# Patient Record
Sex: MALE | URBAN - METROPOLITAN AREA
[De-identification: names, ages, dates, MRNs, and addresses within clinical notes are randomized per-mention and may not be internally consistent; named-entity substitution may affect disease eponyms.]

---

## 2019-07-18 ENCOUNTER — APPOINTMENT (OUTPATIENT)
Age: 54
Setting detail: DERMATOLOGY
End: 2019-07-18

## 2019-07-18 VITALS — HEIGHT: 69 IN | WEIGHT: 166 LBS | RESPIRATION RATE: 18 BRPM

## 2019-07-18 DIAGNOSIS — L30.9 DERMATITIS, UNSPECIFIED: ICD-10-CM

## 2019-07-18 PROCEDURE — OTHER PRESCRIPTION: OTHER

## 2019-07-18 PROCEDURE — OTHER MIPS QUALITY: OTHER

## 2019-07-18 PROCEDURE — 11104 PUNCH BX SKIN SINGLE LESION: CPT

## 2019-07-18 PROCEDURE — OTHER TREATMENT REGIMEN: OTHER

## 2019-07-18 PROCEDURE — OTHER MEDICATION COUNSELING: OTHER

## 2019-07-18 PROCEDURE — OTHER COUNSELING: OTHER

## 2019-07-18 PROCEDURE — OTHER BIOPSY BY PUNCH METHOD: OTHER

## 2019-07-18 RX ORDER — CLOBETASOL PROPIONATE 0.5 MG/G
CREAM TOPICAL AS DIRECTED
Qty: 1 | Refills: 2 | Status: ERX | COMMUNITY
Start: 2019-07-18

## 2019-07-18 ASSESSMENT — LOCATION SIMPLE DESCRIPTION DERM
LOCATION SIMPLE: LEFT HAND
LOCATION SIMPLE: RIGHT HAND
LOCATION SIMPLE: CHEST
LOCATION SIMPLE: LEFT ANTERIOR NECK
LOCATION SIMPLE: ABDOMEN

## 2019-07-18 ASSESSMENT — LOCATION DETAILED DESCRIPTION DERM
LOCATION DETAILED: LEFT CLAVICULAR NECK
LOCATION DETAILED: PERIUMBILICAL SKIN
LOCATION DETAILED: LEFT RADIAL PALM
LOCATION DETAILED: LEFT LATERAL INFERIOR CHEST
LOCATION DETAILED: RIGHT LATERAL SUPERIOR CHEST
LOCATION DETAILED: RIGHT ULNAR PALM
LOCATION DETAILED: LEFT MEDIAL INFERIOR CHEST

## 2019-07-18 ASSESSMENT — LOCATION ZONE DERM
LOCATION ZONE: TRUNK
LOCATION ZONE: HAND
LOCATION ZONE: NECK

## 2019-07-18 NOTE — PROCEDURE: TREATMENT REGIMEN
Initiate Treatment: Clobetasol Cream apply twice a day x 14 days ,stop x 7 days , repeat cycle
Detail Level: Zone
Discontinue Regimen: Permethrin Cream
Continue Regimen: Prednisone taper, complete course as directed by PCM\\nIvermectin 3mg, complete course as directed by PCM
Plan: Next appointment pending pathology

## 2019-07-18 NOTE — PROCEDURE: BIOPSY BY PUNCH METHOD
Anesthesia Volume In Cc (Will Not Render If 0): 0.5
Was A Bandage Applied: Yes
Anesthesia Type: 1% lidocaine with epinephrine
Suture Removal: 10 days
Bill 53055 For Specimen Handling/Conveyance To Laboratory?: no
Size Of Lesion In Cm (Optional): 0
Hemostasis: None
Detail Level: Detailed
Punch Size In Mm: 4
Dressing: bandage
Biopsy Type: H and E
Wound Care: Petrolatum
Epidermal Sutures: 4-0 Surgipro
Billing Type: Third-Party Bill

## 2019-07-18 NOTE — PROCEDURE: MEDICATION COUNSELING
Xelelz Pregnancy And Lactation Text: This medication is Pregnancy Category D and is not considered safe during pregnancy.  The risk during breast feeding is also uncertain.

## 2019-07-18 NOTE — HPI: RASH
What Type Of Note Output Would You Prefer (Optional)?: Bullet Format
How Severe Is Your Rash?: moderate
Is This A New Presentation, Or A Follow-Up?: Rash
Additional History: Patient states some of his bumps on his hands are improving. States he has been using permethrin cream nightly.
